# Patient Record
Sex: FEMALE | Race: WHITE | NOT HISPANIC OR LATINO | Employment: UNEMPLOYED | URBAN - METROPOLITAN AREA
[De-identification: names, ages, dates, MRNs, and addresses within clinical notes are randomized per-mention and may not be internally consistent; named-entity substitution may affect disease eponyms.]

---

## 2019-06-27 ENCOUNTER — OFFICE VISIT (OUTPATIENT)
Dept: UROLOGY | Facility: CLINIC | Age: 59
End: 2019-06-27
Payer: COMMERCIAL

## 2019-06-27 VITALS
DIASTOLIC BLOOD PRESSURE: 76 MMHG | WEIGHT: 155 LBS | HEART RATE: 65 BPM | SYSTOLIC BLOOD PRESSURE: 129 MMHG | BODY MASS INDEX: 24.91 KG/M2 | HEIGHT: 66 IN

## 2019-06-27 DIAGNOSIS — R30.0 DYSURIA: Primary | ICD-10-CM

## 2019-06-27 DIAGNOSIS — R35.0 FREQUENCY OF URINATION: ICD-10-CM

## 2019-06-27 DIAGNOSIS — M54.50 ACUTE RIGHT-SIDED LOW BACK PAIN WITHOUT SCIATICA: ICD-10-CM

## 2019-06-27 PROCEDURE — 99999 PR PBB SHADOW E&M-NEW PATIENT-LVL III: CPT | Mod: PBBFAC,,, | Performed by: PHYSICIAN ASSISTANT

## 2019-06-27 PROCEDURE — 3008F PR BODY MASS INDEX (BMI) DOCUMENTED: ICD-10-PCS | Mod: CPTII,S$GLB,, | Performed by: PHYSICIAN ASSISTANT

## 2019-06-27 PROCEDURE — 99203 PR OFFICE/OUTPT VISIT, NEW, LEVL III, 30-44 MIN: ICD-10-PCS | Mod: 25,S$GLB,, | Performed by: PHYSICIAN ASSISTANT

## 2019-06-27 PROCEDURE — 81002 URINALYSIS NONAUTO W/O SCOPE: CPT | Mod: S$GLB,,, | Performed by: PHYSICIAN ASSISTANT

## 2019-06-27 PROCEDURE — 81002 PR URINALYSIS NONAUTO W/O SCOPE: ICD-10-PCS | Mod: S$GLB,,, | Performed by: PHYSICIAN ASSISTANT

## 2019-06-27 PROCEDURE — 3008F BODY MASS INDEX DOCD: CPT | Mod: CPTII,S$GLB,, | Performed by: PHYSICIAN ASSISTANT

## 2019-06-27 PROCEDURE — 99999 PR PBB SHADOW E&M-NEW PATIENT-LVL III: ICD-10-PCS | Mod: PBBFAC,,, | Performed by: PHYSICIAN ASSISTANT

## 2019-06-27 PROCEDURE — 99203 OFFICE O/P NEW LOW 30 MIN: CPT | Mod: 25,S$GLB,, | Performed by: PHYSICIAN ASSISTANT

## 2019-06-27 NOTE — PROGRESS NOTES
CHIEF COMPLAINT:    Mrs. Whitfield is a 59 y.o. female presenting for UTI symptoms.  PRESENTING ILLNESS:    Rachael Whitfield is a 59 y.o. female  who presents for UTI symptoms.   She had 3 glasses of wine last night and then experienced a little dysuria and urinary frequency.  She drank a lot of water and her symptoms went away.  She had a similar experience a few weeks ago but doesn't recall if she drank wine that time.    She also reports pain in her right lower back.  She think it is musculoskeletal related.  She feels the pain when she walks, sits, lay or roll over.   She does not have a personal history of kidney stones but a family history (dad, brother, and sister).  She used to get UTIs a long time ago.  Her last UTI was over 5 years ago.  She did not have any urinary symptoms just fevers and chills.    She doesn't drink a lot of water, maybe 8-12oz a day.  She drinks coffee, soft drinks, and occasionally wine/tea.   She reports a good urinary stream and complete bladder emptying.      Today she feels ok. No urinary complaints now.   Occasionally her back hurts.     REVIEW OF SYSTEMS:      Constitutional: Negative for fever and chills.   HENT: Negative for hearing loss.   Eyes: Negative for visual disturbance.   Respiratory: Negative for shortness of breath.   Cardiovascular: Negative for chest pain.   Gastrointestinal: Negative for vomiting, and constipation.   Genitourinary:  See HPI  Neurological: Negative for dizziness.   Hematological: Does not bruise/bleed easily.   Psychiatric/Behavioral: Negative for confusion.     PATIENT HISTORY:    History reviewed. No pertinent past medical history.    Past Surgical History:   Procedure Laterality Date     SECTION      TUBAL LIGATION      tummy tuck         History reviewed. No pertinent family history.    Social History     Socioeconomic History    Marital status:      Spouse name: Not on file    Number of children: Not on file    Years of  education: Not on file    Highest education level: Not on file   Occupational History    Not on file   Social Needs    Financial resource strain: Not on file    Food insecurity:     Worry: Not on file     Inability: Not on file    Transportation needs:     Medical: Not on file     Non-medical: Not on file   Tobacco Use    Smoking status: Never Smoker   Substance and Sexual Activity    Alcohol use: No    Drug use: No    Sexual activity: Not on file   Lifestyle    Physical activity:     Days per week: Not on file     Minutes per session: Not on file    Stress: Not on file   Relationships    Social connections:     Talks on phone: Not on file     Gets together: Not on file     Attends Zoroastrianism service: Not on file     Active member of club or organization: Not on file     Attends meetings of clubs or organizations: Not on file     Relationship status: Not on file   Other Topics Concern    Not on file   Social History Narrative    Not on file       Allergies:  Patient has no known allergies.    Medications:  No current outpatient medications on file.    PHYSICAL EXAMINATION:    Constitutional: She appears well-developed and well-nourished.  She is in no apparent distress.    Eyes: No scleral icterus noted bilaterally. No discharge bilaterally.    Nose: No rhinorrhea    Cardiovascular: Normal rate.  No pitting edema noted in lower extremities bilaterally    Pulmonary/Chest: Effort normal. No respiratory distress.     Abdominal:  She exhibits no distension.  There is no CVA tenderness.     Lymphadenopathy:          Right: No supraclavicular adenopathy present.        Left: No supraclavicular adenopathy present.     Neurological: She is alert and oriented to person, place, and time.     Skin: Skin is warm and dry.     Psych: Cooperative with normal affect.        Physical Exam   Musculoskeletal:        Back:          LABS:    U/a: 1.025, pH 5, tr blood otherwise negative.  In office microscopic u/a done 1  RBC per hpf.      IMPRESSION:    Encounter Diagnoses   Name Primary?    Dysuria Yes    Frequency of urination     Acute right-sided low back pain without sciatica        PLAN:  Urine today is clear of any signs of infection.    Patient no longer experiencing urinary symptoms.    Recommend 64oz of water daily  Daily probiotic  Discussed bladder irritants (caffeine and alcohol).  Recommend limiting if not avoid such irritants.   Back pain associated with position changes, likely musculoskeletal.  Recommend applying heat to lower back and following up with pcp.      Follow up as needed.   Gayle Hendrickson PA-C

## 2019-06-27 NOTE — PATIENT INSTRUCTIONS
Recommend 64oz of water daily  Daily probiotic  Discussed that bladder irritants (caffeine and alcohol) can cause urgency/frequency.  Recommend limiting if not avoid such irritants.

## 2020-01-14 DIAGNOSIS — Z12.31 ENCOUNTER FOR SCREENING MAMMOGRAM FOR MALIGNANT NEOPLASM OF BREAST: Primary | ICD-10-CM

## 2020-01-21 ENCOUNTER — HOSPITAL ENCOUNTER (OUTPATIENT)
Dept: RADIOLOGY | Facility: HOSPITAL | Age: 60
Discharge: HOME | End: 2020-01-21
Attending: OBSTETRICS & GYNECOLOGY
Payer: COMMERCIAL

## 2020-01-21 VITALS — HEIGHT: 66 IN | WEIGHT: 155 LBS | BODY MASS INDEX: 24.91 KG/M2

## 2020-01-21 DIAGNOSIS — Z12.31 ENCOUNTER FOR SCREENING MAMMOGRAM FOR MALIGNANT NEOPLASM OF BREAST: ICD-10-CM

## 2020-01-21 PROCEDURE — 77067 SCR MAMMO BI INCL CAD: CPT | Mod: TC,PO

## 2020-06-10 ENCOUNTER — OFFICE VISIT (OUTPATIENT)
Dept: PLASTIC SURGERY | Facility: CLINIC | Age: 60
End: 2020-06-10
Payer: COMMERCIAL

## 2020-06-10 VITALS
DIASTOLIC BLOOD PRESSURE: 66 MMHG | BODY MASS INDEX: 24.91 KG/M2 | HEIGHT: 66 IN | SYSTOLIC BLOOD PRESSURE: 123 MMHG | HEART RATE: 70 BPM | WEIGHT: 155 LBS

## 2020-06-10 DIAGNOSIS — R22.30 MASS OF SHOULDER REGION: Primary | ICD-10-CM

## 2020-06-10 PROCEDURE — 99202 OFFICE O/P NEW SF 15 MIN: CPT | Mod: S$GLB,,, | Performed by: SURGERY

## 2020-06-10 PROCEDURE — 3008F PR BODY MASS INDEX (BMI) DOCUMENTED: ICD-10-PCS | Mod: CPTII,S$GLB,, | Performed by: SURGERY

## 2020-06-10 PROCEDURE — 99999 PR PBB SHADOW E&M-EST. PATIENT-LVL III: CPT | Mod: PBBFAC,,, | Performed by: SURGERY

## 2020-06-10 PROCEDURE — 99202 PR OFFICE/OUTPT VISIT, NEW, LEVL II, 15-29 MIN: ICD-10-PCS | Mod: S$GLB,,, | Performed by: SURGERY

## 2020-06-10 PROCEDURE — 99999 PR PBB SHADOW E&M-EST. PATIENT-LVL III: ICD-10-PCS | Mod: PBBFAC,,, | Performed by: SURGERY

## 2020-06-10 PROCEDURE — 3008F BODY MASS INDEX DOCD: CPT | Mod: CPTII,S$GLB,, | Performed by: SURGERY

## 2020-06-10 NOTE — PROGRESS NOTES
Patient presents Plastic surgery Clinic with a lipoma of the left shoulder.  It is large approximately 6 x 6 cm that is mobile but it feels deep.  We will go ahead and schedule her for surgery to remove this under general anesthetic

## 2020-06-18 DIAGNOSIS — Z01.818 PRE-OP TESTING: Primary | ICD-10-CM

## 2020-06-29 DIAGNOSIS — R22.30 MASS OF SHOULDER REGION: Primary | ICD-10-CM

## 2020-06-30 ENCOUNTER — TELEPHONE (OUTPATIENT)
Dept: PREADMISSION TESTING | Facility: HOSPITAL | Age: 60
End: 2020-06-30

## 2020-06-30 NOTE — ANESTHESIA PAT ROS NOTE
06/30/2020  Rachael Whitfield is a 60 y.o., female.      Pre-op Assessment          Review of Systems  Anesthesia Hx:  No problems with previous Anesthesia  History of prior surgery of interest to airway management or planning:   Social:  Social Alcohol Use, Non-Smoker    Hematology/Oncology:  Hematology Normal   Oncology Normal     EENT/Dental:EENT/Dental Normal   Cardiovascular:  Cardiovascular Normal Exercise tolerance: good     Pulmonary:  Pulmonary Normal    Renal/:  Renal/ Normal     Hepatic/GI:  Hepatic/GI Normal    Musculoskeletal:  Musculoskeletal Normal    Neurological:  Neurology Normal    Endocrine:  Endocrine Normal    Dermatological:   Lipoma Left shouldar   Psych:  Psychiatric Normal              Anesthesia Assessment: Preoperative EQUATION    Planned Procedure: Procedure(s) (LRB):  EXCISION, LIPOMA (Left)  Requested Anesthesia Type:General  Surgeon: Irwin Coronado MD  Service: Plastics  Known or anticipated Date of Surgery:7/20/2020    Surgeon notes: reviewed    Electronic QUestionnaire Assessment completed via nurse interview with patient.        Triage considerations:     The patient has no apparent active cardiac condition (No unstable coronary Syndrome such as severe unstable angina or recent [<1 month] myocardial infarction, decompensated CHF, severe valvular   disease or significant arrhythmia)    Previous anesthesia records:Not available    Last PCP note: > 1 year ago , within [facility]  Subspecialty notes: Urology, plastic surgery    Other important co-morbidities: none      Tests already available:  No recent tests.             Instructions given. (See in Nurse's note)    Optimization:  Denies any issues with anesthesia in past  Plan:    Testing:  Hemoglobin and covid testing   Pre-anesthesia  visit       Visit focus: none     Consultation:none      Navigation: Tests  Scheduled.                          Results will be tracked by Preop Clinic.    Covid test reviewed and noted. SLICK Bullard

## 2020-06-30 NOTE — TELEPHONE ENCOUNTER
----- Message from Becky Brown RN sent at 6/30/2020  3:13 PM CDT -----  Needs lab 7/17 before covid on 7/17

## 2020-07-17 ENCOUNTER — LAB VISIT (OUTPATIENT)
Dept: LAB | Facility: HOSPITAL | Age: 60
End: 2020-07-17
Attending: ANESTHESIOLOGY
Payer: COMMERCIAL

## 2020-07-17 ENCOUNTER — TELEPHONE (OUTPATIENT)
Dept: PLASTIC SURGERY | Facility: CLINIC | Age: 60
End: 2020-07-17

## 2020-07-17 ENCOUNTER — ANESTHESIA EVENT (OUTPATIENT)
Dept: SURGERY | Facility: HOSPITAL | Age: 60
End: 2020-07-17
Payer: COMMERCIAL

## 2020-07-17 ENCOUNTER — LAB VISIT (OUTPATIENT)
Dept: SURGERY | Facility: CLINIC | Age: 60
End: 2020-07-17
Payer: COMMERCIAL

## 2020-07-17 DIAGNOSIS — Z01.818 PRE-OP TESTING: ICD-10-CM

## 2020-07-17 DIAGNOSIS — Z01.818 PREOP TESTING: ICD-10-CM

## 2020-07-17 LAB — HGB BLD-MCNC: 13.5 G/DL (ref 12–16)

## 2020-07-17 PROCEDURE — 85018 HEMOGLOBIN: CPT

## 2020-07-17 PROCEDURE — 36415 COLL VENOUS BLD VENIPUNCTURE: CPT

## 2020-07-17 PROCEDURE — U0003 INFECTIOUS AGENT DETECTION BY NUCLEIC ACID (DNA OR RNA); SEVERE ACUTE RESPIRATORY SYNDROME CORONAVIRUS 2 (SARS-COV-2) (CORONAVIRUS DISEASE [COVID-19]), AMPLIFIED PROBE TECHNIQUE, MAKING USE OF HIGH THROUGHPUT TECHNOLOGIES AS DESCRIBED BY CMS-2020-01-R: HCPCS

## 2020-07-19 LAB — SARS-COV-2 RNA RESP QL NAA+PROBE: NOT DETECTED

## 2020-07-19 NOTE — ANESTHESIA PREPROCEDURE EVALUATION
[facility]  Anesthesia Pre-Operative Evaluation         Patient Name: Rachael Whitfield  YOB: 1960  MRN: [id number]    SUBJECTIVE:     Pre-operative evaluation for Procedure(s) (LRB):  EXCISION, LIPOMA (Left)     2020    Rachael Whitfield is a 60 y.o. female w/ no significant past medical history.     Patient now presents for the above procedure(s).         Prev airway: None documented.        There is no problem list on file for this patient.      Review of patient's allergies indicates:  No Known Allergies    Current Inpatient Medications:      No current facility-administered medications on file prior to encounter.      No current outpatient medications on file prior to encounter.       Past Surgical History:   Procedure Laterality Date     SECTION      TUBAL LIGATION      tummy tuck         Social History     Socioeconomic History    Marital status:      Spouse name: Not on file    Number of children: Not on file    Years of education: Not on file    Highest education level: Not on file   Occupational History    Not on file   Social Needs    Financial resource strain: Not on file    Food insecurity     Worry: Not on file     Inability: Not on file    Transportation needs     Medical: Not on file     Non-medical: Not on file   Tobacco Use    Smoking status: Never Smoker   Substance and Sexual Activity    Alcohol use: No     Comment: rare    Drug use: No    Sexual activity: Not on file   Lifestyle    Physical activity     Days per week: Not on file     Minutes per session: Not on file    Stress: Not on file   Relationships    Social connections     Talks on phone: Not on file     Gets together: Not on file     Attends Jain service: Not on file     Active member of club or organization: Not on file     Attends meetings of clubs or organizations: Not on file     Relationship status: Not on file   Other Topics Concern    Not on file   Social  History Narrative    Not on file       OBJECTIVE:     Vital Signs Range (Last 24H):         Significant Labs:  Lab Results   Component Value Date    WBC 10.89 (H) 09/05/2012    HGB 13.5 07/17/2020    HCT 32.2 (L) 09/05/2012     09/05/2012    ALT 10 09/05/2012    AST 11 09/05/2012     (L) 09/05/2012    K 3.4 (L) 09/05/2012     09/05/2012    CREATININE 0.6 09/05/2012    BUN 11 09/05/2012    CO2 23 09/05/2012       Diagnostic Studies: No relevant studies.    EKG:   No results found for this or any previous visit.    2D ECHO:  TTE:  No results found for this or any previous visit.    TRESSA:  No results found for this or any previous visit.    ASSESSMENT/PLAN:     Anesthesia Evaluation    I have reviewed the Patient Summary Reports.    I have reviewed the Nursing Notes.    I have reviewed the Medications.     Review of Systems  Anesthesia Hx:  No problems with previous Anesthesia  History of prior surgery of interest to airway management or planning: Denies Family Hx of Anesthesia complications.   Denies Personal Hx of Anesthesia complications.   Social:  Social Alcohol Use, Non-Smoker    Hematology/Oncology:  Hematology Normal   Oncology Normal     EENT/Dental:EENT/Dental Normal   Cardiovascular:  Cardiovascular Normal Exercise tolerance: good     Pulmonary:  Pulmonary Normal    Renal/:  Renal/ Normal     Hepatic/GI:  Hepatic/GI Normal    Musculoskeletal:  Musculoskeletal Normal    Neurological:  Neurology Normal    Endocrine:  Endocrine Normal    Dermatological:   Lipoma Left shouldar   Psych:  Psychiatric Normal              Anesthesia Plan  Type of Anesthesia, risks & benefits discussed:  Anesthesia Type:  general  Patient's Preference:   Intra-op Monitoring Plan: standard ASA monitors  Intra-op Monitoring Plan Comments:   Post Op Pain Control Plan: multimodal analgesia, IV/PO Opioids PRN and per primary service following discharge from PACU  Post Op Pain Control Plan Comments:   Induction:    IV  Beta Blocker:  Patient is not currently on a Beta-Blocker (No further documentation required).       Informed Consent:    ASA Score: 1     Day of Surgery Review of History & Physical:            Ready For Surgery From Anesthesia Perspective.

## 2020-07-20 ENCOUNTER — ANESTHESIA (OUTPATIENT)
Dept: SURGERY | Facility: HOSPITAL | Age: 60
End: 2020-07-20
Payer: COMMERCIAL

## 2020-07-20 ENCOUNTER — HOSPITAL ENCOUNTER (OUTPATIENT)
Facility: HOSPITAL | Age: 60
Discharge: HOME | End: 2020-07-20
Attending: SURGERY | Admitting: SURGERY
Payer: COMMERCIAL

## 2020-07-20 VITALS
OXYGEN SATURATION: 100 % | RESPIRATION RATE: 14 BRPM | BODY MASS INDEX: 24.75 KG/M2 | TEMPERATURE: 98 F | HEIGHT: 66 IN | SYSTOLIC BLOOD PRESSURE: 113 MMHG | DIASTOLIC BLOOD PRESSURE: 57 MMHG | HEART RATE: 62 BPM | WEIGHT: 154 LBS

## 2020-07-20 DIAGNOSIS — D17.9 LIPOMA: Primary | ICD-10-CM

## 2020-07-20 PROCEDURE — 36000707: Performed by: SURGERY

## 2020-07-20 PROCEDURE — 25000003 PHARM REV CODE 250: Performed by: STUDENT IN AN ORGANIZED HEALTH CARE EDUCATION/TRAINING PROGRAM

## 2020-07-20 PROCEDURE — 23071 PR EXCISION TUMOR SOFT TISSUE SHOULDER SUBQ 3+CM: ICD-10-PCS | Mod: LT,,, | Performed by: SURGERY

## 2020-07-20 PROCEDURE — 63600175 PHARM REV CODE 636 W HCPCS: Performed by: STUDENT IN AN ORGANIZED HEALTH CARE EDUCATION/TRAINING PROGRAM

## 2020-07-20 PROCEDURE — 36000706: Performed by: SURGERY

## 2020-07-20 PROCEDURE — 37000008 HC ANESTHESIA 1ST 15 MINUTES: Performed by: SURGERY

## 2020-07-20 PROCEDURE — 23071 EXC SHOULDER LES SC 3 CM/>: CPT | Mod: LT,,, | Performed by: SURGERY

## 2020-07-20 PROCEDURE — 88304 PR  SURG PATH,LEVEL III: ICD-10-PCS | Mod: 26,,, | Performed by: PATHOLOGY

## 2020-07-20 PROCEDURE — 94761 N-INVAS EAR/PLS OXIMETRY MLT: CPT

## 2020-07-20 PROCEDURE — 37000009 HC ANESTHESIA EA ADD 15 MINS: Performed by: SURGERY

## 2020-07-20 PROCEDURE — 71000015 HC POSTOP RECOV 1ST HR: Performed by: SURGERY

## 2020-07-20 PROCEDURE — D9220A PRA ANESTHESIA: ICD-10-PCS | Mod: ,,, | Performed by: ANESTHESIOLOGY

## 2020-07-20 PROCEDURE — 88304 TISSUE EXAM BY PATHOLOGIST: CPT | Performed by: PATHOLOGY

## 2020-07-20 PROCEDURE — 25000003 PHARM REV CODE 250: Performed by: SURGERY

## 2020-07-20 PROCEDURE — 71000033 HC RECOVERY, INTIAL HOUR: Performed by: SURGERY

## 2020-07-20 PROCEDURE — 88304 TISSUE EXAM BY PATHOLOGIST: CPT | Mod: 26,,, | Performed by: PATHOLOGY

## 2020-07-20 PROCEDURE — D9220A PRA ANESTHESIA: Mod: ,,, | Performed by: ANESTHESIOLOGY

## 2020-07-20 RX ORDER — FENTANYL CITRATE 50 UG/ML
INJECTION, SOLUTION INTRAMUSCULAR; INTRAVENOUS
Status: DISCONTINUED | OUTPATIENT
Start: 2020-07-20 | End: 2020-07-20

## 2020-07-20 RX ORDER — SODIUM CHLORIDE 0.9 % (FLUSH) 0.9 %
10 SYRINGE (ML) INJECTION
Status: DISCONTINUED | OUTPATIENT
Start: 2020-07-20 | End: 2020-07-20 | Stop reason: HOSPADM

## 2020-07-20 RX ORDER — IBUPROFEN 600 MG/1
600 TABLET ORAL EVERY 8 HOURS PRN
Qty: 21 TABLET | Refills: 0 | Status: SHIPPED | OUTPATIENT
Start: 2020-07-20 | End: 2020-07-27

## 2020-07-20 RX ORDER — ONDANSETRON 2 MG/ML
4 INJECTION INTRAMUSCULAR; INTRAVENOUS EVERY 12 HOURS PRN
Status: DISCONTINUED | OUTPATIENT
Start: 2020-07-20 | End: 2020-07-20 | Stop reason: HOSPADM

## 2020-07-20 RX ORDER — MIDAZOLAM HYDROCHLORIDE 1 MG/ML
INJECTION, SOLUTION INTRAMUSCULAR; INTRAVENOUS
Status: DISCONTINUED | OUTPATIENT
Start: 2020-07-20 | End: 2020-07-20

## 2020-07-20 RX ORDER — DEXTROMETHORPHAN HYDROBROMIDE, GUAIFENESIN 5; 100 MG/5ML; MG/5ML
650 LIQUID ORAL EVERY 8 HOURS
Qty: 21 TABLET | Refills: 0 | Status: SHIPPED | OUTPATIENT
Start: 2020-07-20 | End: 2020-07-27

## 2020-07-20 RX ORDER — SODIUM CHLORIDE 9 MG/ML
INJECTION, SOLUTION INTRAVENOUS CONTINUOUS PRN
Status: DISCONTINUED | OUTPATIENT
Start: 2020-07-20 | End: 2020-07-20

## 2020-07-20 RX ORDER — CEFAZOLIN SODIUM 1 G/3ML
2 INJECTION, POWDER, FOR SOLUTION INTRAMUSCULAR; INTRAVENOUS
Status: COMPLETED | OUTPATIENT
Start: 2020-07-20 | End: 2020-07-20

## 2020-07-20 RX ORDER — PROPOFOL 10 MG/ML
VIAL (ML) INTRAVENOUS
Status: DISCONTINUED | OUTPATIENT
Start: 2020-07-20 | End: 2020-07-20

## 2020-07-20 RX ORDER — HYDROMORPHONE HYDROCHLORIDE 1 MG/ML
0.4 INJECTION, SOLUTION INTRAMUSCULAR; INTRAVENOUS; SUBCUTANEOUS EVERY 5 MIN PRN
Status: DISCONTINUED | OUTPATIENT
Start: 2020-07-20 | End: 2020-07-20 | Stop reason: HOSPADM

## 2020-07-20 RX ORDER — PHENYLEPHRINE HYDROCHLORIDE 10 MG/ML
INJECTION INTRAVENOUS
Status: DISCONTINUED | OUTPATIENT
Start: 2020-07-20 | End: 2020-07-20

## 2020-07-20 RX ORDER — DEXAMETHASONE SODIUM PHOSPHATE 4 MG/ML
INJECTION, SOLUTION INTRA-ARTICULAR; INTRALESIONAL; INTRAMUSCULAR; INTRAVENOUS; SOFT TISSUE
Status: DISCONTINUED | OUTPATIENT
Start: 2020-07-20 | End: 2020-07-20

## 2020-07-20 RX ORDER — MUPIROCIN 20 MG/G
OINTMENT TOPICAL
Status: DISCONTINUED | OUTPATIENT
Start: 2020-07-20 | End: 2020-07-20 | Stop reason: HOSPADM

## 2020-07-20 RX ORDER — HYDROCODONE BITARTRATE AND ACETAMINOPHEN 5; 325 MG/1; MG/1
1 TABLET ORAL EVERY 4 HOURS PRN
Status: DISCONTINUED | OUTPATIENT
Start: 2020-07-20 | End: 2020-07-20 | Stop reason: HOSPADM

## 2020-07-20 RX ORDER — MUPIROCIN 20 MG/G
OINTMENT TOPICAL 2 TIMES DAILY
Status: DISCONTINUED | OUTPATIENT
Start: 2020-07-20 | End: 2020-07-20 | Stop reason: HOSPADM

## 2020-07-20 RX ORDER — ROCURONIUM BROMIDE 10 MG/ML
INJECTION, SOLUTION INTRAVENOUS
Status: DISCONTINUED | OUTPATIENT
Start: 2020-07-20 | End: 2020-07-20

## 2020-07-20 RX ORDER — LIDOCAINE HYDROCHLORIDE 10 MG/ML
1 INJECTION, SOLUTION EPIDURAL; INFILTRATION; INTRACAUDAL; PERINEURAL ONCE
Status: COMPLETED | OUTPATIENT
Start: 2020-07-20 | End: 2020-07-20

## 2020-07-20 RX ORDER — LIDOCAINE HCL/PF 100 MG/5ML
SYRINGE (ML) INTRAVENOUS
Status: DISCONTINUED | OUTPATIENT
Start: 2020-07-20 | End: 2020-07-20

## 2020-07-20 RX ORDER — LIDOCAINE HYDROCHLORIDE AND EPINEPHRINE 10; 10 MG/ML; UG/ML
INJECTION, SOLUTION INFILTRATION; PERINEURAL
Status: DISCONTINUED | OUTPATIENT
Start: 2020-07-20 | End: 2020-07-20 | Stop reason: HOSPADM

## 2020-07-20 RX ADMIN — PROPOFOL 160 MG: 10 INJECTION, EMULSION INTRAVENOUS at 10:07

## 2020-07-20 RX ADMIN — LIDOCAINE HYDROCHLORIDE 0.3 MG: 10 INJECTION, SOLUTION EPIDURAL; INFILTRATION; INTRACAUDAL; PERINEURAL at 09:07

## 2020-07-20 RX ADMIN — CEFAZOLIN 2 G: 330 INJECTION, POWDER, FOR SOLUTION INTRAMUSCULAR; INTRAVENOUS at 10:07

## 2020-07-20 RX ADMIN — MIDAZOLAM HYDROCHLORIDE 2 MG: 1 INJECTION, SOLUTION INTRAMUSCULAR; INTRAVENOUS at 10:07

## 2020-07-20 RX ADMIN — DEXAMETHASONE SODIUM PHOSPHATE 4 MG: 4 INJECTION, SOLUTION INTRAMUSCULAR; INTRAVENOUS at 10:07

## 2020-07-20 RX ADMIN — LIDOCAINE HYDROCHLORIDE 40 MG: 20 INJECTION, SOLUTION INTRAVENOUS at 10:07

## 2020-07-20 RX ADMIN — FENTANYL CITRATE 100 MCG: 50 INJECTION, SOLUTION INTRAMUSCULAR; INTRAVENOUS at 10:07

## 2020-07-20 RX ADMIN — ROCURONIUM BROMIDE 35 MG: 10 INJECTION, SOLUTION INTRAVENOUS at 10:07

## 2020-07-20 RX ADMIN — MUPIROCIN: 20 OINTMENT TOPICAL at 09:07

## 2020-07-20 RX ADMIN — PHENYLEPHRINE HYDROCHLORIDE 100 MCG: 10 INJECTION INTRAVENOUS at 11:07

## 2020-07-20 RX ADMIN — PHENYLEPHRINE HYDROCHLORIDE 200 MCG: 10 INJECTION INTRAVENOUS at 11:07

## 2020-07-20 RX ADMIN — PHENYLEPHRINE HYDROCHLORIDE 200 MCG: 10 INJECTION INTRAVENOUS at 10:07

## 2020-07-20 RX ADMIN — SODIUM CHLORIDE: 0.9 INJECTION, SOLUTION INTRAVENOUS at 10:07

## 2020-07-20 RX ADMIN — SUGAMMADEX 200 MG: 100 INJECTION, SOLUTION INTRAVENOUS at 11:07

## 2020-07-20 NOTE — H&P
"History & Physical    SUBJECTIVE:     History of Present Illness:  Patient is a 60 y.o. female w/ no significant past medical history presenting today for excision of lipoma. No interval changes in the patient's medical history.    No chief complaint on file.      Review of patient's allergies indicates:  No Known Allergies    No current facility-administered medications for this encounter.      No current outpatient medications on file.       Past Medical History:   Diagnosis Date    Lipoma of shoulder     left     Past Surgical History:   Procedure Laterality Date     SECTION      TUBAL LIGATION      mckay morgan       Family History   Problem Relation Age of Onset    Breast cancer Sister     Breast cancer Maternal Aunt      Social History     Tobacco Use    Smoking status: Never Smoker   Substance Use Topics    Alcohol use: No     Comment: rare    Drug use: No        Review of Systems:  Review of Systems   All other systems reviewed and are negative.      OBJECTIVE:     Vital Signs (Most Recent)     5' 6" (1.676 m)  69.9 kg (154 lb)     Physical Exam:  Physical Exam  Vitals signs reviewed.   Constitutional:       General: She is not in acute distress.     Appearance: Normal appearance. She is not toxic-appearing.   HENT:      Head: Normocephalic and atraumatic.      Right Ear: External ear normal.      Left Ear: External ear normal.      Nose: Nose normal.      Mouth/Throat:      Mouth: Mucous membranes are moist.      Pharynx: Oropharynx is clear.   Eyes:      Extraocular Movements: Extraocular movements intact.      Pupils: Pupils are equal, round, and reactive to light.   Neck:      Musculoskeletal: Normal range of motion.   Cardiovascular:      Rate and Rhythm: Normal rate.      Pulses: Normal pulses.   Pulmonary:      Effort: Pulmonary effort is normal. No respiratory distress.   Abdominal:      General: Abdomen is flat. There is no distension.      Palpations: Abdomen is soft. "   Musculoskeletal: Normal range of motion.   Skin:     General: Skin is warm and dry.   Neurological:      General: No focal deficit present.      Mental Status: She is alert and oriented to person, place, and time.   Psychiatric:         Mood and Affect: Mood normal.         Behavior: Behavior normal.         ASSESSMENT/PLAN:     Rachael Whitfield is a 60 year old female with no significant past medical history presenting for excision of lipoma.  -to OR today

## 2020-07-20 NOTE — PROGRESS NOTES
Patient assigned to this writer by charge nurse. The patient was  escorted to [facility] room 12 after emptying her bladder. The patient is currently  changing into a hospital gown. This writer is completing a chart review and reviewing MD orders.

## 2020-07-20 NOTE — PROGRESS NOTES
Pt discharge instructions reviewed with patient and family. Pt verbalized understandings of instructions. Copy of AVS given to patient. All questions answered. Pt ambulated to wheelchair for discharge. All belongings sent home with patient. IV discontinued. Medications delivered from pharmacy.

## 2020-07-20 NOTE — BRIEF OP NOTE
[facility]  Brief Operative Note    Surgery Date: 7/20/2020     Surgeon(s) and Role:     * Irwin Coronado MD - Primary     * Duran Galarza MD - Fellow    Assisting Surgeon: None    Pre-op Diagnosis:  Mass of shoulder region [R22.30]    Post-op Diagnosis:  Post-Op Diagnosis Codes:     * Mass of shoulder region [R22.30]    Procedure(s) (LRB):  EXCISION, LIPOMA (Left)    Anesthesia: General    Description of the findings of the procedure(s): See op note for details    Estimated Blood Loss: * No values recorded between 7/20/2020 10:51 AM and 7/20/2020 11:25 AM *         Specimens:   Specimen (12h ago, onward)    None            Discharge Note    OUTCOME: Patient tolerated treatment/procedure well without complication and is now ready for discharge.    DISPOSITION: Home or Self Care    FINAL DIAGNOSIS:  Lipoma    FOLLOWUP: In clinic    DISCHARGE INSTRUCTIONS:  No discharge procedures on file.

## 2020-07-20 NOTE — ANESTHESIA PROCEDURE NOTES
Intubation  Performed by: Chas Vicente MD  Authorized by: Debby Chang MD     Intubation:     Induction:  Intravenous    Intubated:  Postinduction    Mask Ventilation:  Easy mask    Attempts:  1    Attempted By:  Resident anesthesiologist    Blade:  Maryse 3    Laryngeal View Grade: Grade I - full view of chords      Difficult Airway Encountered?: No      Complications:  None    Airway Device Size:  7.0    Style/Cuff Inflation:  Cuffed    Inflation Amount (mL):  7    Tube secured:  22    Placement Verified By:  Capnometry    Complicating Factors:  None    Findings Post-Intubation:  BS equal bilateral

## 2020-07-20 NOTE — TRANSFER OF CARE
"Anesthesia Transfer of Care Note    Patient: Rachael Whitfield    Procedure(s) Performed: Procedure(s) (LRB):  EXCISION, LIPOMA (Left)    Patient location: PACU    Anesthesia Type: general    Transport from OR: Transported from OR on 6-10 L/min O2 by face mask with adequate spontaneous ventilation    Post pain: adequate analgesia    Post assessment: no apparent anesthetic complications    Post vital signs: stable    Level of consciousness: awake and alert    Nausea/Vomiting: no nausea/vomiting    Complications: none    Transfer of care protocol was followed      Last vitals:   Visit Vitals  /62 (BP Location: Right arm, Patient Position: Lying)   Pulse 64   Temp 36.8 °C (98.3 °F) (Oral)   Resp 17   Ht 5' 6" (1.676 m)   Wt 69.9 kg (154 lb)   LMP 08/15/2012   SpO2 99%   Breastfeeding No   BMI 24.86 kg/m²     "

## 2020-07-21 NOTE — ANESTHESIA POSTPROCEDURE EVALUATION
Anesthesia Post Evaluation    Patient: Rachael Whitfield    Procedure(s) Performed: Procedure(s) (LRB):  EXCISION, LIPOMA (Left)    Final Anesthesia Type: general    Patient location during evaluation: PACU  Patient participation: Yes- Able to Participate  Level of consciousness: awake and alert  Post-procedure vital signs: reviewed and stable  Pain management: adequate  Airway patency: patent    PONV status at discharge: No PONV  Anesthetic complications: no      Cardiovascular status: blood pressure returned to baseline  Respiratory status: unassisted  Hydration status: euvolemic  Follow-up not needed.          Vitals Value Taken Time   /58 07/20/20 1216   Temp 36.5 °C (97.7 °F) 07/20/20 1145   Pulse 64 07/20/20 1233   Resp 14 07/20/20 1232   SpO2 100 % 07/20/20 1233   Vitals shown include unvalidated device data.      Event Time   Out of Recovery 12:15:00         Pain/Capri Score: Capri Score: 10 (7/20/2020 12:00 PM)

## 2020-07-21 NOTE — OP NOTE
Date of surgery 07/20/2020  Preoperative diagnosis lipoma left shoulder  Postoperative diagnosis is the same  Procedure performed excision lipoma of the shoulder measuring approximately 6 cm x 4 cm with layered closure final incision 6 cm  Surgeon:  Ivonne  Anesthesia:  General  Complications:  None  Drains:  None  Blood loss minimal    Patient was placed in supine position after adequate general anesthesia was prepped and draped in a normal sterile fashion an elliptical incision over the lipoma was awaited after injecting with 1% lidocaine with epinephrine the lipoma was easily removed.  Hemostasis was achieved using the Bovie.  The incision was then closed in layers using 2-0 Vicryl 3-0 Monocryl and a running 4-0 Monocryl subcuticular suture there were no complications end dictation

## 2020-07-22 LAB
FINAL PATHOLOGIC DIAGNOSIS: NORMAL
GROSS: NORMAL

## 2020-07-29 ENCOUNTER — OFFICE VISIT (OUTPATIENT)
Dept: PLASTIC SURGERY | Facility: CLINIC | Age: 60
End: 2020-07-29
Payer: COMMERCIAL

## 2020-07-29 VITALS
WEIGHT: 152.69 LBS | BODY MASS INDEX: 24.54 KG/M2 | DIASTOLIC BLOOD PRESSURE: 64 MMHG | HEIGHT: 66 IN | HEART RATE: 80 BPM | SYSTOLIC BLOOD PRESSURE: 123 MMHG

## 2020-07-29 DIAGNOSIS — Z09 SURGERY FOLLOW-UP EXAMINATION: Primary | ICD-10-CM

## 2020-07-29 PROCEDURE — 99024 POSTOP FOLLOW-UP VISIT: CPT | Mod: S$GLB,,, | Performed by: SURGERY

## 2020-07-29 PROCEDURE — 99999 PR PBB SHADOW E&M-EST. PATIENT-LVL III: ICD-10-PCS | Mod: PBBFAC,,, | Performed by: SURGERY

## 2020-07-29 PROCEDURE — 99999 PR PBB SHADOW E&M-EST. PATIENT-LVL III: CPT | Mod: PBBFAC,,, | Performed by: SURGERY

## 2020-07-29 PROCEDURE — 99024 PR POST-OP FOLLOW-UP VISIT: ICD-10-PCS | Mod: S$GLB,,, | Performed by: SURGERY

## 2020-07-29 NOTE — PROGRESS NOTES
Patient is status post excision of a lipoma from the left shoulder.  Everything is well healed.  The pathology is benign.  Patient is discharged from the clinic.

## 2021-04-26 ENCOUNTER — PATIENT MESSAGE (OUTPATIENT)
Dept: RESEARCH | Facility: HOSPITAL | Age: 61
End: 2021-04-26

## 2021-10-27 ENCOUNTER — TELEPHONE (OUTPATIENT)
Dept: PULMONOLOGY | Facility: CLINIC | Age: 61
End: 2021-10-27
Payer: COMMERCIAL

## 2025-07-14 ENCOUNTER — OFFICE VISIT (OUTPATIENT)
Dept: UROGYNECOLOGY | Facility: CLINIC | Age: 65
End: 2025-07-14
Payer: COMMERCIAL

## 2025-07-14 VITALS
HEART RATE: 68 BPM | DIASTOLIC BLOOD PRESSURE: 67 MMHG | SYSTOLIC BLOOD PRESSURE: 139 MMHG | BODY MASS INDEX: 25.3 KG/M2 | WEIGHT: 157.44 LBS | HEIGHT: 66 IN

## 2025-07-14 DIAGNOSIS — Z12.4 CERVICAL CANCER SCREENING: ICD-10-CM

## 2025-07-14 DIAGNOSIS — N95.2 VAGINAL ATROPHY: ICD-10-CM

## 2025-07-14 DIAGNOSIS — Z12.31 BREAST CANCER SCREENING BY MAMMOGRAM: Primary | ICD-10-CM

## 2025-07-14 DIAGNOSIS — N39.46 URINARY INCONTINENCE, MIXED: ICD-10-CM

## 2025-07-14 PROCEDURE — 87186 SC STD MICRODIL/AGAR DIL: CPT | Performed by: OBSTETRICS & GYNECOLOGY

## 2025-07-14 PROCEDURE — 87624 HPV HI-RISK TYP POOLED RSLT: CPT | Performed by: OBSTETRICS & GYNECOLOGY

## 2025-07-14 PROCEDURE — 88175 CYTOPATH C/V AUTO FLUID REDO: CPT | Mod: TC | Performed by: OBSTETRICS & GYNECOLOGY

## 2025-07-14 PROCEDURE — 99999 PR PBB SHADOW E&M-NEW PATIENT-LVL IV: CPT | Mod: PBBFAC,,, | Performed by: OBSTETRICS & GYNECOLOGY

## 2025-07-14 RX ORDER — ESTRADIOL 0.1 MG/G
CREAM VAGINAL
Qty: 42.5 G | Refills: 11 | Status: SHIPPED | OUTPATIENT
Start: 2025-07-14

## 2025-07-14 NOTE — PROGRESS NOTES
[facility] - UROGYNECOLOGY  [address] [address]    Rachael Whitfield  [id number]  1960    Consulting Physician: Self, Aaareferral   GYN: none  Primary M.D.: No primary care provider on file.    Chief Complaint   Patient presents with    bladder leakage        HPI:     1)  UI:  (+) MAURY (mostly physical things--then got better with some weight loss; currently cough/sneeze/walking) > (+) UUI (mild)  X years.  (+) pads:1/day (liner), usually minimum-mod wetness.  Daytime frequency: Q 4 hours.  Nocturia: Yes: 0-1/night.   (--) dysuria,  (--) hematuria,  (--) frequent UTIs.  (+) complete bladder emptying.    2)  POP:  Absent. (--) vaginal bleeding. (--) vaginal discharge. (+) sexually active.  (+) dyspareunia. With entrance--dryness. Uses [place] PRN. (+)  Vaginal dryness.  (--) vaginal estrogen use.     3)  BM:  (--) constipation/straining.  (--) chronic diarrhea. (--) hematochezia.  (--) fecal incontinence.  (--) fecal smearing/urgency.  (--) rectal prolapse.  (+) complete evacuation.     Past Medical History  Past Medical History:   Diagnosis Date    Lipoma of shoulder     left        Past Surgical History  Past Surgical History:   Procedure Laterality Date     SECTION      LIPOMA RESECTION Left 2020    Procedure: EXCISION, LIPOMA;  Surgeon: Irwin Coronado MD;  Location: [facility] OR [address] [place] [address];  Service: Plastics;  Laterality: Left;    TUBAL LIGATION      tummy tuck     --c/s BTL  --abd plasty (Babycos)    Hysterectomy: No    Past Ob History     x 0.  C/s x 3.    Largest infant weight: 7#11oz (1 of twins), other twin was 7#2oz.     Gynecologic History  LMP: Patient's last menstrual period was 08/15/2012.  Age of menarche: 17 yo  Age of menopause: early 50s  Menstrual history: h/o terminal menorrhagia   Pap test: , normal.  History of abnormal paps: No.  History of STIs:  No  Mammogram: Date of last: 10/2023.  Result: Normal  Colonoscopy: Date of last:  "2025 ([facility] at ).  Result:  polyps per report.  Repeat due:  2030.    DEXA:  Date of last: 2023.  Result:  normal.  Repeat due: 66 yo.     Family History  Family History   Problem Relation Name Age of Onset    Breast cancer Sister      Breast cancer Maternal Aunt        Colon CA: Yes - PGM  Breast CA: Yes - sister, MA (both pre menopausal); thinks sister was NEG genetic testing  GYN CA: No   CA: No    Social History  Tobacco Use History[1].    Social History     Substance and Sexual Activity   Alcohol Use No    Comment: rare   .    Social History     Substance and Sexual Activity   Drug Use No   .  The patient is .  Resides in [place] [address].  Employment status: retired .     Allergies  Review of patient's allergies indicates:  No Known Allergies    Medications  Medications Ordered Prior to Encounter[2]    Review of Systems A 14 point ROS was reviewed with pertinent positives as noted above in the history of present illness.      Constitutional: negative  Eyes: negative  Endocrine: negative  Gastrointestinal: negative  Cardiovascular: negative  Respiratory: negative  Allergic/Immunologic: negative  Integumentary: negative  Psychiatric: negative  Musculoskeletal: negative   Ear/Nose/Throat: negative  Neurologic: negative  Genitourinary: SEE HPI  Hematologic/Lymphatic: negative   Breast: negative    Urogynecologic Exam  /67 (BP Location: Right arm, Patient Position: Sitting)   Pulse 68   Ht 5' 6" (1.676 m)   Wt 71.4 kg (157 lb 6.5 oz)   LMP 08/15/2012   BMI 25.41 kg/m²     GENERAL APPEARANCE:  The patient is well-developed, well-nourished.  Neck:  Supple with no thyromegaly, no carotid bruits.  Heart:  Regular rate and rhythm, no murmurs, rubs or gallops.  Lungs:  Clear.  No CVA tenderness.  Abdomen:  Soft, nontender, nondistended, no hepatosplenomegaly.  Incisions:  Abd plasty    PELVIC:    External genitalia:  Normal Bartholins, Skenes and labia bilaterally.    Urethra:  " No caruncle, diverticulum or masses.  (+) hypermobility.    Vagina:  Atrophy (+) , no bladder masses or tender, no discharge.    Cervix:  normal appearance  Uterus: normal size, contour, position, consistency, mobility, non-tender  Adnexa: Not palpable.    POP-Q:    Deferred.  No obvious POP present with valsalva.     NEUROLOGIC:  Cranial nerves 2 through 12 intact.  Strength 5/5.  DTRs 2+ lower extremities.  S2 through 4 normal.  Sacral reflexes intact.    EXT: JIMENEZ, 2+ pulses bilaterally, no C/C/E    COUGH STRESS TEST:  negative  KEGEL: 2 /5    RECTAL:    External:  Normal, (--) hemorrhoids, (--) dovetailing.   Internal:   deferred    PVR: 50 mL    Impression    1. Breast cancer screening by mammogram    2. Cervical cancer screening    3. Urinary incontinence, mixed    4. Vaginal atrophy        Initial Plan  The patient was counseled regarding these issues. The patient was given a summary sheet containing each of these issues with possible options for evaluation and management. When appropriate, we also reviewed computer-generated diagrams specific to their diagnoses..  All questions were addressed to the patient's satisfaction.     1)  Mixed urinary incontinence, urge > stress:    --urine C&S  --Empty bladder every 3 hours.  Empty well: wait a minute, lean forward on toilet.    --Avoid dietary irritants (see sheet).  Keep diary x 3-5 days to determine your irritants.  --KEGELS: do 10 in AM and 10 in PM, holding each x 10 seconds.  When you feel urge to go, STOP, KEGEL, and when urge has passed, then go to bathroom.  Start pelvic floor PT.   [facility]:  [address] [place] [address]  Get Directions  HOURS:  Monday - Thursday: 7:00am - 6:00pm  Friday: 7:00am - 2:00pm  PHONE:  [phone]  FAX:  [phone]    --STRESS:  Pessary vs. Sling vs periurethral bulking.      2)  Vaginal atrophy (dryness):    --start Vaginal estrogen:  --Use 0.5 grams of estrogen cream in vagina with  applicator or dime-sized amount with finger (as far as can reach internally) nightly x 2 weeks, then twice a week thereafter.  You can also apply a dime-sized amount with your finger around the vaginal opening and inner lips at same frequency.     --Vaginal estrogen may help to decrease pain related to dryness with intercourse and urinary symptoms (urgency/frequency/UTIs) around menopause.    https://www.yourpelvicfloor.org/media/Low-Dose_Vaginal_Estrogen_Therapy.pdf    Non-estrogen options for vaginal dryness (for intercourse):  --coconut oil: dime-sized amount with finger (as far as can reach internally) and around the vaginal opening and inner lips up to nightly as needed  --Uberlube, Astroglide, [place] liquibeads, Satin by Sliquid Natural Intimate Moisturizer    3)  PCP: [facility] (Bailee Orozco Morgan)  GYN ([facility]): Bobo Avendano Brunet, Henne, Ranger, Yolis Pierce   --[facility]: Dustin Bhagat    4)  RTC 3-4 months.     Thank you for requesting consultation of your patient.  I look forward to participating in their care.    I spent a total of 45 minutes on the day of the visit. This includes face to face time and non-face to face time preparing to see the patient (eg, review of tests), obtaining and/or reviewing separately obtained history, documenting clinical information in the electronic or other health record, independently interpreting results and communicating results to the patient/family/caregiver, or care coordinator. This time is separate and distinct from the procedure(s) performed.      Visit complexity today is associated with medical care services that are part of the ongoing care related to the single serious and/or complex condition of Mixed urinary incontinence (SHERLEY), vaginal atrophy. A longitudinal relationship exists or is being developed between the patient and this practitioner for the care of this condition.       Kyle Welch  Female Pelvic Medicine and Reconstructive  Surgery  [facility] [place], [place]           [1]   Social History  Tobacco Use   Smoking Status Never   Smokeless Tobacco Not on file   [2]   No current outpatient medications on file prior to visit.     No current facility-administered medications on file prior to visit.

## 2025-07-14 NOTE — PATIENT INSTRUCTIONS
Bladder Irritants  Certain foods and drinks have been associated with worsening symptoms of urinary frequency, urgency, urge incontinence, or bladder pain. If you suffer from any of these conditions, you may wish to try eliminating one or more of these foods from your diet and see if your symptoms improve. If bladder symptoms are related to dietary factors, strict adherence to a diet thateliminates the food should bring marked relief in 10 days. Once you are feeling better, you can begin to add foods back into your diet, one at a time. If symptoms return, you will be able to identify the irritant. As you add foods back to your diet it is very important that you drink significant amounts of water.    -----------------------------------------------------------------------------------------------  List of Common Bladder Irritants*  Alcoholic beverages  Apples and apple juice  Cantaloupe  Carbonated beverages  Chili and spicy foods  Chocolate  Citrus fruit  Coffee (including decaffeinated)  Cranberries and cranberry juice  Grapes  Guava  Milk Products: milk, cheese, cottage cheese, yogurt, ice cream  Peaches  Pineapple  Plums  Strawberries  Sugar especially artificial sweeteners, saccharin, aspartame, corn sweeteners, honey, fructose, sucrose, lactose  Tea  Tomatoes and tomato juice  Vitamin B complex  Vinegar  *Most people are not sensitive to ALL of these products; your goal is to find the foods that make YOUR symptoms worse.  ---------------------------------------------------------------------------------------------------    Low-acid fruit substitutions include apricots, papaya, pears and watermelon. Coffee drinkers can drink Kava or other lowacid instant drinks. Tea drinkers can substitute non-citrus herbal and sun brewed teas. Calcium carbonate co-buffered with calcium ascorbate can be substituted for Vitamin C. Prelief is a dietary supplement that works as an acid blocker for the bladder.    Where to get more  information:        Overcoming Bladder Disorders by Pooja Veloz and Promise Robledo, 1990        You Dont Have to Live with Cystitis! By Josie Gallagher, 1988  http://www.urologymanagement.org/oab  --------------------------------------    1)  Mixed urinary incontinence, urge > stress:    --urine C&S  --Empty bladder every 3 hours.  Empty well: wait a minute, lean forward on toilet.    --Avoid dietary irritants (see sheet).  Keep diary x 3-5 days to determine your irritants.  --KEGELS: do 10 in AM and 10 in PM, holding each x 10 seconds.  When you feel urge to go, STOP, KEGEL, and when urge has passed, then go to bathroom.  Start pelvic floor PT.   [facility]:  [facility] [place] [address]  Get Directions  HOURS:  Monday - Thursday: 7:00am - 6:00pm  Friday: 7:00am - 2:00pm  PHONE:  [phone]  FAX:  [phone]    --STRESS:  Pessary vs. Sling vs periurethral bulking.      2)  Vaginal atrophy (dryness):    --start Vaginal estrogen:  --Use 0.5 grams of estrogen cream in vagina with applicator or dime-sized amount with finger (as far as can reach internally) nightly x 2 weeks, then twice a week thereafter.  You can also apply a dime-sized amount with your finger around the vaginal opening and inner lips at same frequency.     --Vaginal estrogen may help to decrease pain related to dryness with intercourse and urinary symptoms (urgency/frequency/UTIs) around menopause.    https://www.yourpelvicfloor.org/media/Low-Dose_Vaginal_Estrogen_Therapy.pdf    Non-estrogen options for vaginal dryness (for intercourse):  --coconut oil: dime-sized amount with finger (as far as can reach internally) and around the vaginal opening and inner lips up to nightly as needed  --Uberlube, Astroglide, [place] liquibeads, Satin by Sliquid Natural Intimate Moisturizer    3)  PCP: [facility] (Bailee Orozco Morgan)  GYN ([facility]): Bobo Avendano Brunet, Henne, Ranger, Yolis  Stan   -- [facility]: Dustin Bhagat    4)  RTC 3-4 months.

## 2025-07-16 LAB — BACTERIA UR CULT: ABNORMAL

## 2025-07-17 LAB
INSULIN SERPL-ACNC: NORMAL U[IU]/ML
LAB AP BETHESDA CATEGORY: NORMAL
LAB AP CLINICAL FINDINGS: NORMAL
LAB AP CONTRACEPTIVES: NORMAL
LAB AP LMP DATE: NORMAL
LAB AP OCHS PAP SPECIMEN ADEQUACY: NORMAL
LAB AP OHS PAP INTERPRETATION: NORMAL
LAB AP PAP DISCLAIMER COMMENTS: NORMAL
LAB AP PAP ESTROGEN REPLACEMENT THERAPY: NORMAL
LAB AP PAP PMP: NORMAL
LAB AP PAP PREVIOUS BX: NORMAL
LAB AP PAP PRIOR TREATMENT: NORMAL
LAB AP PERFORMING LOCATION(S): NORMAL

## 2025-07-18 ENCOUNTER — RESULTS FOLLOW-UP (OUTPATIENT)
Dept: UROGYNECOLOGY | Facility: CLINIC | Age: 65
End: 2025-07-18
Payer: COMMERCIAL

## 2025-07-18 DIAGNOSIS — N39.0 ACUTE UTI: Primary | ICD-10-CM

## 2025-07-18 LAB
HPV DNA, HIGH RISK TYPE 16, PCR (OHS): NEGATIVE
HPV DNA, HIGH RISK TYPE 18, PCR (OHS): NEGATIVE
HPV DNA, HIGH RISK TYPE OTHER, PCR (OHS): NEGATIVE

## 2025-07-18 RX ORDER — NITROFURANTOIN 25; 75 MG/1; MG/1
100 CAPSULE ORAL 2 TIMES DAILY
Qty: 14 CAPSULE | Refills: 0 | Status: SHIPPED | OUTPATIENT
Start: 2025-07-18 | End: 2025-07-25

## 2025-07-22 ENCOUNTER — TELEPHONE (OUTPATIENT)
Dept: UROGYNECOLOGY | Facility: CLINIC | Age: 65
End: 2025-07-22
Payer: COMMERCIAL

## 2025-07-22 NOTE — TELEPHONE ENCOUNTER
----- Message from Kyle Welch MD sent at 7/18/2025  6:28 PM CDT -----  Please let patient know she has UTI. Rx for macrobid sent to her pharmacy. This can contribute to bladder leakage; so, treating may help that. But, she should still do other things we discussed,   including using estrogen cream, which can help reduce chance of future UTIs. Thanks!  ----- Message -----  From: Lab, Background User  Sent: 7/15/2025   4:44 PM CDT  To: Kyle Welch MD

## 2025-07-22 NOTE — TELEPHONE ENCOUNTER
Pt received Dr. Welch's [employer] message, picked up her Rx and started it, but reported she oddly has no UTI symptoms. She is made aware to reach out to us with any questions or concerns that may arise.

## 2025-07-29 ENCOUNTER — HOSPITAL ENCOUNTER (OUTPATIENT)
Dept: RADIOLOGY | Facility: OTHER | Age: 65
Discharge: HOME | End: 2025-07-29
Attending: OBSTETRICS & GYNECOLOGY
Payer: COMMERCIAL

## 2025-07-29 VITALS — WEIGHT: 157 LBS | HEIGHT: 66 IN | BODY MASS INDEX: 25.23 KG/M2

## 2025-07-29 DIAGNOSIS — Z12.31 BREAST CANCER SCREENING BY MAMMOGRAM: ICD-10-CM

## 2025-07-29 PROCEDURE — 77063 BREAST TOMOSYNTHESIS BI: CPT | Mod: TC

## (undated) DEVICE — SKINMARKER & RULER REGULAR X-F

## (undated) DEVICE — PENCIL ROCKER SWITCH 10FT CORD

## (undated) DEVICE — ELECTRODE REM PLYHSV RETURN 9

## (undated) DEVICE — SYR 10CC LUER LOCK

## (undated) DEVICE — GOWN AERO CHROME W/ TOWEL XL

## (undated) DEVICE — NDL HYPO REG 25G X 1 1/2

## (undated) DEVICE — SUT MONOCRYL 3-0 PS-2 UND

## (undated) DEVICE — DRESSING TRANS 4X4 TEGADERM

## (undated) DEVICE — DRESSING XEROFORM FOIL PK 1X8

## (undated) DEVICE — SUT MCRYL PLUS 4-0 PS2 27IN

## (undated) DEVICE — SUT SILK 3-0 BLK BR SH 30IN

## (undated) DEVICE — GAUZE SPONGE 4X4 12PLY

## (undated) DEVICE — SEE MEDLINE ITEM 157128

## (undated) DEVICE — TRAY MINOR GEN SURG

## (undated) DEVICE — SEE MEDLINE ITEM 152622

## (undated) DEVICE — BLADE SURG CARBON STEEL SZ11

## (undated) DEVICE — GOWN SURGICAL X-LARGE

## (undated) DEVICE — SPONGE DERMACEA 4X4IN 12PLY

## (undated) DEVICE — SEE MEDLINE ITEM 152572

## (undated) DEVICE — PACK UNIVERSAL SPLIT II